# Patient Record
Sex: FEMALE | Race: WHITE
[De-identification: names, ages, dates, MRNs, and addresses within clinical notes are randomized per-mention and may not be internally consistent; named-entity substitution may affect disease eponyms.]

---

## 2019-02-22 ENCOUNTER — HOSPITAL ENCOUNTER (EMERGENCY)
Dept: HOSPITAL 25 - UCEAST | Age: 19
Discharge: HOME | End: 2019-02-22
Payer: SELF-PAY

## 2019-02-22 VITALS — SYSTOLIC BLOOD PRESSURE: 123 MMHG | DIASTOLIC BLOOD PRESSURE: 70 MMHG

## 2019-02-22 DIAGNOSIS — B27.90: Primary | ICD-10-CM

## 2019-02-22 PROCEDURE — 86665 EPSTEIN-BARR CAPSID VCA: CPT

## 2019-02-22 PROCEDURE — G0463 HOSPITAL OUTPT CLINIC VISIT: HCPCS

## 2019-02-22 PROCEDURE — 99201: CPT

## 2019-02-22 PROCEDURE — 86664 EPSTEIN-BARR NUCLEAR ANTIGEN: CPT

## 2019-02-22 PROCEDURE — 86308 HETEROPHILE ANTIBODY SCREEN: CPT

## 2019-02-22 PROCEDURE — 36415 COLL VENOUS BLD VENIPUNCTURE: CPT

## 2019-02-22 NOTE — UC
Throat Pain/Nasal Asad HPI





- HPI Summary


HPI Summary: 





Patient has had swollen tonsils for 2 weeks, she is also complaining of 

bodyaches and fatique. denies any fever, [facility]ils are still swollen.





- History of Current Complaint


Chief Complaint: UCGeneralIllness


Stated Complaint: SORE THROAT, AND FEVER


Time Seen by Provider: 02/22/19 16:34


Hx Obtained From: Patient


Hx Last Menstrual Period: [id number]


Pregnant?: No


Onset/Duration: Sudden Onset, Lasting Weeks


Severity: Moderate


Pain Intensity: 4


Associated Signs & Symptoms: Positive: Dysphagia





- Allergies/Home Medications


Allergies/Adverse Reactions: 


 Allergies











Allergy/AdvReac Type Severity Reaction Status Date / Time


 


No Known Allergies Allergy   Verified 02/22/19 16:27











Home Medications: 


 Home Medications





Desogestrel-Ethinyl Estradiol [Juleber 0.15-30 mg-Mcg] 1 tab PO DAILY 02/22/19 [

History Confirmed 02/22/19]


Lisdexamfetamine Dimesylate [Vyvanse] 30 mg PO DAILY 02/22/19 [History 

Confirmed 02/22/19]











PMH/Surg Hx/FS Hx/Imm Hx


Previously Healthy: Yes





- Surgical History


Surgical History: Yes


Surgery Procedure, Year, and Place: appy





- Family History


Known Family History: Positive: Hypertension





- Social History


Alcohol Use: Weekly


Substance Use Type: None


Smoking Status (MU): Never Smoked Tobacco





Review of Systems


All Other Systems Reviewed And Are Negative: Yes


Constitutional: Positive: Fatigue


Skin: Positive: Negative


Eyes: Positive: Negative


ENT: Positive: Sore Throat


Respiratory: Positive: Cough


Cardiovascular: Positive: Negative


Gastrointestinal: Positive: Negative


Genitourinary: Positive: Negative


Motor: Positive: Negative


Neurovascular: Positive: Negative


Musculoskeletal: Positive: Negative


Neurological: Positive: Headache


Psychological: Positive: Negative


Is Patient Immunocompromised?: No





Physical Exam


Triage Information Reviewed: Yes


Appearance: Well-Nourished, Ill-Appearing, Pain Distress


Vital Signs: 


 Initial Vital Signs











Temp  98.3 F   02/22/19 16:21


 


Pulse  77   02/22/19 16:21


 


Resp  16   02/22/19 16:21


 


BP  123/70   02/22/19 16:21


 


Pulse Ox  100   02/22/19 16:21











Vital Signs Reviewed: Yes


Eye Exam: Normal


ENT: Positive: TM bulging, Tonsillar exudate - +3


Dental Exam: Normal


Neck exam: Normal


Respiratory Exam: Normal


Respiratory: Positive: Chest non-tender, Lungs clear, Normal breath sounds


Cardiovascular Exam: Normal


Cardiovascular: Positive: RRR, No Murmur, Pulses Normal


Bowel Sounds: Positive: Present


Musculoskeletal Exam: Normal


Neurological Exam: Normal


Psychological Exam: Normal


Skin Exam: Normal





Throat Pain/Nasal Course/Dx





- Course


Course Of Treatment: hx obtained, exam performed ,meds reviewed, blood work 

obtained and steroids prescribed. patient requesting the blood work, she has no 

signs of bacterial infection at this time.





- Differential Dx/Diagnosis


Differential Diagnosis/HQI/PQRI: Mononucleosis, Otitis Media, Pharyngitis, 

Sinusitis, URI


Provider Diagnosis: 


 Mononucleosis








Discharge





- Sign-Out/Discharge


Documenting (check all that apply): Patient Departure


All imaging exams completed and their final reports reviewed: No Studies





- Discharge Plan


Condition: Stable


Disposition: HOME


Patient Education Materials:  Mononucleosis (ED)


Referrals: 


No Primary Care Phys,NOPCP [Primary Care Provider] - 


Additional Instructions: 


1. TAKE THE MEDICATION AS PRESCRIBED.


2. GET PLENTY OF REST AND FLUIDS





- Billing Disposition and Condition


Condition: STABLE


Disposition: Home